# Patient Record
Sex: MALE | Race: BLACK OR AFRICAN AMERICAN | NOT HISPANIC OR LATINO | Employment: UNEMPLOYED | URBAN - METROPOLITAN AREA
[De-identification: names, ages, dates, MRNs, and addresses within clinical notes are randomized per-mention and may not be internally consistent; named-entity substitution may affect disease eponyms.]

---

## 2022-10-03 DIAGNOSIS — R15.9 RECTAL INCONTINENCE: Primary | ICD-10-CM

## 2022-11-08 ENCOUNTER — OFFICE VISIT (OUTPATIENT)
Dept: SURGERY | Facility: CLINIC | Age: 52
End: 2022-11-08
Payer: MEDICAID

## 2022-11-08 VITALS
HEIGHT: 69 IN | RESPIRATION RATE: 18 BRPM | OXYGEN SATURATION: 96 % | TEMPERATURE: 98 F | DIASTOLIC BLOOD PRESSURE: 80 MMHG | SYSTOLIC BLOOD PRESSURE: 121 MMHG | HEART RATE: 99 BPM | BODY MASS INDEX: 34.09 KG/M2 | WEIGHT: 230.19 LBS

## 2022-11-08 DIAGNOSIS — R15.9 RECTAL INCONTINENCE: ICD-10-CM

## 2022-11-08 PROCEDURE — 99214 OFFICE O/P EST MOD 30 MIN: CPT | Mod: PBBFAC

## 2022-11-08 RX ORDER — TRAZODONE HYDROCHLORIDE 100 MG/1
100 TABLET ORAL NIGHTLY
COMMUNITY
Start: 2022-11-02

## 2022-11-08 RX ORDER — AMLODIPINE BESYLATE 10 MG/1
10 TABLET ORAL DAILY
COMMUNITY
Start: 2022-11-02

## 2022-11-08 RX ORDER — LOSARTAN POTASSIUM AND HYDROCHLOROTHIAZIDE 12.5; 1 MG/1; MG/1
1 TABLET ORAL DAILY
COMMUNITY
Start: 2022-11-02

## 2022-11-08 RX ORDER — NICOTINE 7MG/24HR
PATCH, TRANSDERMAL 24 HOURS TRANSDERMAL
COMMUNITY
Start: 2022-11-02

## 2022-11-08 RX ORDER — TIZANIDINE 4 MG/1
4 TABLET ORAL NIGHTLY
COMMUNITY
Start: 2022-11-02

## 2022-11-08 RX ORDER — SIMVASTATIN 20 MG/1
20 TABLET, FILM COATED ORAL NIGHTLY
COMMUNITY
Start: 2022-11-02

## 2022-11-08 NOTE — PROGRESS NOTES
[facility] Note    HPI: Mr. Michael Cash is a 52M with PMH of HTN, HLD, JAN, tobacco/alcohol/cocaine abuse, hemorrhoids who presents for evaluation of rectal incontinence and bloody stools. States he has rectal bleeding for >10 years, but only sought medical care this year. Received colonoscopy in February which was significant only for hemorrhoids, had banding procedure in May. Says symptoms were resolved after the procedure but recurred 1.5 months after. Denies fever, chills, N/V, changes in bowel habits. Has 1 normal BM every morning with stool leaking for 15 minutes after each BM. Currently on nicotine patch for smoking cessation, hasn't used crack cocaine for 6 months. Currently drinking 1 beer per day. Seeing his primary physician and [facility] in [place] for chest pain.    PMH:   Past Medical History:   Diagnosis Date    Hyperlipidemia     Hypertension     Sleep apnea, unspecified     Unspecified hemorrhoids       Meds:   Current Outpatient Medications:     amLODIPine (NORVASC) 10 MG tablet, Take 10 mg by mouth once daily., Disp: , Rfl:     losartan-hydrochlorothiazide 100-12.5 mg (HYZAAR) 100-12.5 mg Tab, Take 1 tablet by mouth once daily., Disp: , Rfl:     nicotine (NICODERM CQ) 7 mg/24 hr, , Disp: , Rfl:     simvastatin (ZOCOR) 20 MG tablet, Take 20 mg by mouth every evening., Disp: , Rfl:     tiZANidine (ZANAFLEX) 4 MG tablet, Take 4 mg by mouth every evening., Disp: , Rfl:     traZODone (DESYREL) 100 MG tablet, Take 100 mg by mouth every evening., Disp: , Rfl:   Allergies: Review of patient's allergies indicates:  No Known Allergies  Social History:   Social History     Tobacco Use    Smoking status: Some Days     Types: Cigarettes    Smokeless tobacco: Never   Substance Use Topics    Alcohol use: Yes     Comment: ocassionally     Family History:   Family History   Problem Relation Age of Onset    Hypertension Mother     Diabetes Mother     Cancer Mother     Hypertension Father     Cancer  Father      Surgical History:   Past Surgical History:   Procedure Laterality Date    hemorrhoid banding       Review of Systems:  Respiratory: Denies shortness of breath or chest pain  Cardiac: Chest pain on exertion. Denies palpitations or swelling in hands/feet.  Gastrointestinal: Fecal incontinence after each BM. Denies nausea, denies vomiting.     Objective:    Vitals:  Vitals:    11/08/22 1115   BP: 121/80   Pulse: 99   Resp: 18   Temp: 98.1 °F (36.7 °C)        Physical Exam:  Gen: NAD  Neuro: awake, alert, answering questions appropriately  Resp: non-labored breathing, JULIETA  Abd: soft, ND, NT  Ext: moves all 4 spontaneously and purposefully  Skin: warm, well perfuse  GI: Right posterior internal hemorrhoid column noted. no elicia blood per rectum. Adequate sphincter tone.      Assessment/Plan: Mr. Michael Cash is a 52M with PMH of HTN, HLD, JAN, tobacco/alcohol/cocaine abuse, hemorrhoids who presents for evaluation of rectal incontinence and bloody stools after banding procedure for hemorrhoids in May.    - Counseled patient on bowel habits to reduce hemorrhoid irritation. Avoid straining, sitting on toilet for longer than 5 minutes, and constipation.  - Daily fiber supplementation with metamucil  - Suspect symptoms are related to large right posterior hemorrhoid  - RTC in 3 months    Sam Santillan MD  General Surgery [facility]